# Patient Record
Sex: MALE | Race: WHITE | NOT HISPANIC OR LATINO | Employment: OTHER | URBAN - NONMETROPOLITAN AREA
[De-identification: names, ages, dates, MRNs, and addresses within clinical notes are randomized per-mention and may not be internally consistent; named-entity substitution may affect disease eponyms.]

---

## 2023-02-15 PROBLEM — Z93.3 S/P COLOSTOMY (MULTI): Status: ACTIVE | Noted: 2023-02-15

## 2023-02-15 PROBLEM — I10 HYPERTENSION: Status: ACTIVE | Noted: 2023-02-15

## 2023-02-15 PROBLEM — D64.9 ANEMIA: Status: ACTIVE | Noted: 2023-02-15

## 2023-02-15 PROBLEM — M19.049 ARTHRITIS OF HAND: Status: ACTIVE | Noted: 2023-02-15

## 2023-02-15 PROBLEM — R63.4 WEIGHT LOSS: Status: ACTIVE | Noted: 2023-02-15

## 2023-02-15 PROBLEM — E55.9 VITAMIN D DEFICIENCY: Status: ACTIVE | Noted: 2023-02-15

## 2023-02-15 PROBLEM — E78.5 DYSLIPIDEMIA: Status: ACTIVE | Noted: 2023-02-15

## 2023-02-15 PROBLEM — R00.0 TACHYCARDIA: Status: ACTIVE | Noted: 2023-02-15

## 2023-02-15 PROBLEM — E87.6 HYPOKALEMIA: Status: ACTIVE | Noted: 2023-02-15

## 2023-02-15 PROBLEM — R29.898 FINGER DYSFUNCTION: Status: ACTIVE | Noted: 2023-02-15

## 2023-02-15 RX ORDER — POTASSIUM CHLORIDE 750 MG/1
1 CAPSULE, EXTENDED RELEASE ORAL DAILY
COMMUNITY
Start: 2018-01-17 | End: 2023-05-16

## 2023-02-15 RX ORDER — CARVEDILOL 6.25 MG/1
1 TABLET ORAL
COMMUNITY
Start: 2016-11-09 | End: 2023-03-08 | Stop reason: SDUPTHER

## 2023-02-15 RX ORDER — AMLODIPINE BESYLATE 10 MG/1
1 TABLET ORAL DAILY
COMMUNITY
Start: 2015-11-05 | End: 2023-05-01

## 2023-02-15 RX ORDER — CHOLECALCIFEROL (VITAMIN D3) 125 MCG
1 CAPSULE ORAL EVERY OTHER DAY
COMMUNITY
Start: 2019-02-13 | End: 2023-10-31

## 2023-02-15 RX ORDER — HYDROCHLOROTHIAZIDE 25 MG/1
1 TABLET ORAL DAILY
COMMUNITY
Start: 2015-10-15 | End: 2023-04-24

## 2023-03-08 DIAGNOSIS — I10 PRIMARY HYPERTENSION: Primary | ICD-10-CM

## 2023-03-08 DIAGNOSIS — R00.0 TACHYCARDIA: ICD-10-CM

## 2023-03-08 RX ORDER — CARVEDILOL 6.25 MG/1
6.25 TABLET ORAL
Qty: 180 TABLET | Refills: 3 | Status: SHIPPED | OUTPATIENT
Start: 2023-03-08 | End: 2024-04-08

## 2023-03-29 ENCOUNTER — OFFICE VISIT (OUTPATIENT)
Dept: PRIMARY CARE | Facility: CLINIC | Age: 63
End: 2023-03-29
Payer: MEDICARE

## 2023-03-29 VITALS
BODY MASS INDEX: 27.81 KG/M2 | DIASTOLIC BLOOD PRESSURE: 66 MMHG | WEIGHT: 187.8 LBS | SYSTOLIC BLOOD PRESSURE: 116 MMHG | HEART RATE: 82 BPM | HEIGHT: 69 IN

## 2023-03-29 DIAGNOSIS — Z93.3 S/P COLOSTOMY (MULTI): ICD-10-CM

## 2023-03-29 DIAGNOSIS — Z12.5 PROSTATE CANCER SCREENING: ICD-10-CM

## 2023-03-29 DIAGNOSIS — I10 PRIMARY HYPERTENSION: Primary | ICD-10-CM

## 2023-03-29 DIAGNOSIS — E87.6 HYPOKALEMIA: ICD-10-CM

## 2023-03-29 DIAGNOSIS — E55.9 VITAMIN D DEFICIENCY: ICD-10-CM

## 2023-03-29 DIAGNOSIS — E78.5 DYSLIPIDEMIA: ICD-10-CM

## 2023-03-29 PROCEDURE — 3074F SYST BP LT 130 MM HG: CPT | Performed by: REGISTERED NURSE

## 2023-03-29 PROCEDURE — 99214 OFFICE O/P EST MOD 30 MIN: CPT | Performed by: REGISTERED NURSE

## 2023-03-29 PROCEDURE — 3078F DIAST BP <80 MM HG: CPT | Performed by: REGISTERED NURSE

## 2023-03-29 PROCEDURE — 1159F MED LIST DOCD IN RCRD: CPT | Performed by: REGISTERED NURSE

## 2023-03-29 PROCEDURE — 1160F RVW MEDS BY RX/DR IN RCRD: CPT | Performed by: REGISTERED NURSE

## 2023-03-29 ASSESSMENT — ENCOUNTER SYMPTOMS
DIARRHEA: 0
WHEEZING: 0
DIZZINESS: 0
EYE DISCHARGE: 0
CONFUSION: 0
VOMITING: 0
HEADACHES: 0
EYE REDNESS: 0
DIFFICULTY URINATING: 0
FREQUENCY: 0
CHILLS: 0
WOUND: 0
WEAKNESS: 0
FEVER: 0
NAUSEA: 0
ABDOMINAL PAIN: 0
CONSTIPATION: 0
COUGH: 0
RHINORRHEA: 0
SHORTNESS OF BREATH: 0
NERVOUS/ANXIOUS: 0

## 2023-03-29 NOTE — PROGRESS NOTES
"Subjective   Patient ID: Ramesh Courtney is a 63 y.o. male who presents for Follow-up (No concerns. ).    HPI   Past medical history of colorectal cancer in 2008 s/p colostomy.   Dr. Ramesh Gómez did his surgery   Dr. Mendoza's office before here.     Hypertension- He is taking Amlodipine 10 mg oral tablet and Hydrochlorothiazide 25 mg oral tablet daily. BP today is 116/66     Tachycardia- HR is 82 He is on carvedilol 6.25 mg oral tablet twice a day.     Hypokalemia- K is 3.8. Patient is taking Potassium Chloride capsule every day.     Vitamin D deficiency- Vitamin D level increased, He is on oral vitamin capsule 5000 unit daily.     Dyslipidemia- LDL level improved to 80 from 108 with lifestyle modification.     All other systems reviewed and negative for complaint unless stated above.     Review of Systems   Constitutional:  Negative for chills and fever.   HENT:  Negative for congestion, ear pain and rhinorrhea.    Eyes:  Negative for discharge and redness.   Respiratory:  Negative for cough, shortness of breath and wheezing.    Cardiovascular:  Negative for chest pain and leg swelling.   Gastrointestinal:  Negative for abdominal pain, constipation, diarrhea, nausea and vomiting.   Genitourinary:  Negative for difficulty urinating, frequency and urgency.   Musculoskeletal:  Negative for gait problem.   Skin:  Negative for rash and wound.   Neurological:  Negative for dizziness, weakness and headaches.   Psychiatric/Behavioral:  Negative for confusion. The patient is not nervous/anxious.        Objective   /66 (BP Location: Right arm)   Pulse 82   Ht 1.753 m (5' 9\")   Wt 85.2 kg (187 lb 12.8 oz)   BMI 27.73 kg/m²     Physical Exam  Vitals reviewed.   Constitutional:       Appearance: Normal appearance.   HENT:      Head: Normocephalic.      Right Ear: Tympanic membrane, ear canal and external ear normal.      Left Ear: Tympanic membrane, ear canal and external ear normal.      Nose: No rhinorrhea. "      Mouth/Throat:      Mouth: Mucous membranes are moist.      Pharynx: Oropharynx is clear.   Eyes:      Pupils: Pupils are equal, round, and reactive to light.   Cardiovascular:      Rate and Rhythm: Normal rate and regular rhythm.      Pulses: Normal pulses.   Pulmonary:      Effort: Pulmonary effort is normal.      Breath sounds: Normal breath sounds.   Abdominal:      General: Abdomen is flat. Bowel sounds are normal.      Palpations: Abdomen is soft.   Musculoskeletal:         General: No tenderness. Normal range of motion.      Right lower leg: No edema.      Left lower leg: No edema.   Lymphadenopathy:      Cervical: No cervical adenopathy.   Skin:     General: Skin is warm and dry.      Findings: No rash.   Neurological:      Mental Status: He is alert and oriented to person, place, and time.   Psychiatric:         Mood and Affect: Mood normal.         Behavior: Behavior normal.       Assessment/Plan        HTN  - Continue Amlodipine and Hydrochlorothiazide.  - CBC and BMP is normal     Dyslipidemia  - Continue Lifestyle modification.     Tachycardia  - Continue Carvedilol to 6.25 mg twice a day.      Vitamin D deficiency  - Vitamin D level increased from 22 to 50.  - Continue Vitamin D tablet 5000 Unit every other day now.     Hypokalemia  - Potassium replacement supplement.    S/p colostomy   - doing well      #HCM  Psa ordered   Flu vaccine utd  Colonoscopy 2008 s/p colostomy   Pneumonia vaccine due    Can come in as a walk in for vaccine (electric outage)      Return to clinic in 6 months or earlier as needed.

## 2023-03-31 ENCOUNTER — LAB (OUTPATIENT)
Dept: LAB | Facility: LAB | Age: 63
End: 2023-03-31
Payer: MEDICARE

## 2023-03-31 DIAGNOSIS — E87.6 HYPOKALEMIA: ICD-10-CM

## 2023-03-31 DIAGNOSIS — Z12.5 PROSTATE CANCER SCREENING: ICD-10-CM

## 2023-03-31 DIAGNOSIS — E78.5 DYSLIPIDEMIA: ICD-10-CM

## 2023-03-31 DIAGNOSIS — I10 PRIMARY HYPERTENSION: ICD-10-CM

## 2023-03-31 LAB
ALANINE AMINOTRANSFERASE (SGPT) (U/L) IN SER/PLAS: 20 U/L (ref 10–52)
ALBUMIN (G/DL) IN SER/PLAS: 4.2 G/DL (ref 3.4–5)
ALKALINE PHOSPHATASE (U/L) IN SER/PLAS: 69 U/L (ref 33–136)
ANION GAP IN SER/PLAS: 10 MMOL/L (ref 10–20)
ASPARTATE AMINOTRANSFERASE (SGOT) (U/L) IN SER/PLAS: 16 U/L (ref 9–39)
BASOPHILS (10*3/UL) IN BLOOD BY AUTOMATED COUNT: 0.01 X10E9/L (ref 0–0.1)
BASOPHILS/100 LEUKOCYTES IN BLOOD BY AUTOMATED COUNT: 0.2 % (ref 0–2)
BILIRUBIN TOTAL (MG/DL) IN SER/PLAS: 0.4 MG/DL (ref 0–1.2)
CALCIUM (MG/DL) IN SER/PLAS: 9.2 MG/DL (ref 8.6–10.3)
CARBON DIOXIDE, TOTAL (MMOL/L) IN SER/PLAS: 31 MMOL/L (ref 21–32)
CHLORIDE (MMOL/L) IN SER/PLAS: 102 MMOL/L (ref 98–107)
CHOLESTEROL (MG/DL) IN SER/PLAS: 137 MG/DL (ref 0–199)
CHOLESTEROL IN HDL (MG/DL) IN SER/PLAS: 47.6 MG/DL
CHOLESTEROL/HDL RATIO: 2.9
CREATININE (MG/DL) IN SER/PLAS: 0.8 MG/DL (ref 0.5–1.3)
EOSINOPHILS (10*3/UL) IN BLOOD BY AUTOMATED COUNT: 0.02 X10E9/L (ref 0–0.7)
EOSINOPHILS/100 LEUKOCYTES IN BLOOD BY AUTOMATED COUNT: 0.5 % (ref 0–6)
ERYTHROCYTE DISTRIBUTION WIDTH (RATIO) BY AUTOMATED COUNT: 14 % (ref 11.5–14.5)
ERYTHROCYTE MEAN CORPUSCULAR HEMOGLOBIN CONCENTRATION (G/DL) BY AUTOMATED: 32.6 G/DL (ref 32–36)
ERYTHROCYTE MEAN CORPUSCULAR VOLUME (FL) BY AUTOMATED COUNT: 92 FL (ref 80–100)
ERYTHROCYTES (10*6/UL) IN BLOOD BY AUTOMATED COUNT: 4.49 X10E12/L (ref 4.5–5.9)
GFR MALE: >90 ML/MIN/1.73M2
GLUCOSE (MG/DL) IN SER/PLAS: 97 MG/DL (ref 74–99)
HEMATOCRIT (%) IN BLOOD BY AUTOMATED COUNT: 41.4 % (ref 41–52)
HEMOGLOBIN (G/DL) IN BLOOD: 13.5 G/DL (ref 13.5–17.5)
IMMATURE GRANULOCYTES/100 LEUKOCYTES IN BLOOD BY AUTOMATED COUNT: 0.2 % (ref 0–0.9)
LDL: 74 MG/DL (ref 0–99)
LEUKOCYTES (10*3/UL) IN BLOOD BY AUTOMATED COUNT: 4.3 X10E9/L (ref 4.4–11.3)
LYMPHOCYTES (10*3/UL) IN BLOOD BY AUTOMATED COUNT: 1.23 X10E9/L (ref 1.2–4.8)
LYMPHOCYTES/100 LEUKOCYTES IN BLOOD BY AUTOMATED COUNT: 28.7 % (ref 13–44)
MONOCYTES (10*3/UL) IN BLOOD BY AUTOMATED COUNT: 0.28 X10E9/L (ref 0.1–1)
MONOCYTES/100 LEUKOCYTES IN BLOOD BY AUTOMATED COUNT: 6.5 % (ref 2–10)
NEUTROPHILS (10*3/UL) IN BLOOD BY AUTOMATED COUNT: 2.74 X10E9/L (ref 1.2–7.7)
NEUTROPHILS/100 LEUKOCYTES IN BLOOD BY AUTOMATED COUNT: 63.9 % (ref 40–80)
PLATELETS (10*3/UL) IN BLOOD AUTOMATED COUNT: 267 X10E9/L (ref 150–450)
POTASSIUM (MMOL/L) IN SER/PLAS: 4.1 MMOL/L (ref 3.5–5.3)
PROTEIN TOTAL: 6.7 G/DL (ref 6.4–8.2)
SODIUM (MMOL/L) IN SER/PLAS: 139 MMOL/L (ref 136–145)
TRIGLYCERIDE (MG/DL) IN SER/PLAS: 79 MG/DL (ref 0–149)
UREA NITROGEN (MG/DL) IN SER/PLAS: 19 MG/DL (ref 6–23)
VLDL: 16 MG/DL (ref 0–40)

## 2023-03-31 PROCEDURE — 36415 COLL VENOUS BLD VENIPUNCTURE: CPT

## 2023-03-31 PROCEDURE — 80061 LIPID PANEL: CPT

## 2023-03-31 PROCEDURE — 80053 COMPREHEN METABOLIC PANEL: CPT

## 2023-03-31 PROCEDURE — 84154 ASSAY OF PSA FREE: CPT

## 2023-03-31 PROCEDURE — 85025 COMPLETE CBC W/AUTO DIFF WBC: CPT

## 2023-03-31 PROCEDURE — 84153 ASSAY OF PSA TOTAL: CPT

## 2023-04-03 DIAGNOSIS — R97.20 ELEVATED PSA, LESS THAN 10 NG/ML: Primary | ICD-10-CM

## 2023-04-03 LAB
PROSTATE SPECIFIC AG (NG/ML) IN SER/PLAS: 8.6 NG/ML (ref 0–4)
PROSTATE SPECIFIC AG FREE (NG/ML) IN SER/PLAS: 0.4 NG/ML
PROSTATE SPECIFIC AG FREE/PROSTATE SPECIFIC AG TOTAL IN SER/PLAS: 5 %

## 2023-04-06 LAB
RBC, URINE: 1 /HPF (ref 0–5)
WBC, URINE: 2 /HPF (ref 0–5)

## 2023-04-07 LAB — URINE CULTURE: NO GROWTH

## 2023-04-24 DIAGNOSIS — I10 ESSENTIAL (PRIMARY) HYPERTENSION: ICD-10-CM

## 2023-04-24 RX ORDER — HYDROCHLOROTHIAZIDE 25 MG/1
TABLET ORAL
Qty: 90 TABLET | Refills: 3 | Status: SHIPPED | OUTPATIENT
Start: 2023-04-24 | End: 2024-04-29

## 2023-04-29 DIAGNOSIS — I10 ESSENTIAL (PRIMARY) HYPERTENSION: ICD-10-CM

## 2023-05-01 RX ORDER — AMLODIPINE BESYLATE 10 MG/1
TABLET ORAL
Qty: 90 TABLET | Refills: 1 | Status: SHIPPED | OUTPATIENT
Start: 2023-05-01 | End: 2023-10-27

## 2023-05-01 RX ORDER — TAMSULOSIN HYDROCHLORIDE 0.4 MG/1
1 CAPSULE ORAL NIGHTLY
COMMUNITY
Start: 2023-04-06 | End: 2024-03-27 | Stop reason: WASHOUT

## 2023-05-16 DIAGNOSIS — E87.6 HYPOKALEMIA: ICD-10-CM

## 2023-05-16 RX ORDER — POTASSIUM CHLORIDE 750 MG/1
CAPSULE, EXTENDED RELEASE ORAL
Qty: 30 CAPSULE | Refills: 5 | Status: SHIPPED | OUTPATIENT
Start: 2023-05-16 | End: 2023-11-29

## 2023-07-20 LAB
AMORPHOUS CRYSTALS, URINE: ABNORMAL /HPF
RBC, URINE: ABNORMAL /HPF (ref 0–5)
WBC, URINE: ABNORMAL /HPF (ref 0–5)

## 2023-07-22 LAB — URINE CULTURE: NO GROWTH

## 2023-07-27 ENCOUNTER — HOSPITAL ENCOUNTER (OUTPATIENT)
Dept: DATA CONVERSION | Facility: HOSPITAL | Age: 63
End: 2023-07-27
Attending: STUDENT IN AN ORGANIZED HEALTH CARE EDUCATION/TRAINING PROGRAM
Payer: MEDICARE

## 2023-07-27 DIAGNOSIS — C61 MALIGNANT NEOPLASM OF PROSTATE (MULTI): ICD-10-CM

## 2023-07-27 DIAGNOSIS — R97.20 ELEVATED PROSTATE SPECIFIC ANTIGEN (PSA): ICD-10-CM

## 2023-08-08 LAB
COMPLETE PATHOLOGY REPORT: NORMAL
CONVERTED CLINICAL DIAGNOSIS-HISTORY: NORMAL
CONVERTED FINAL DIAGNOSIS: NORMAL
CONVERTED FINAL REPORT PDF LINK TO COPY AND PASTE: NORMAL
CONVERTED GROSS DESCRIPTION: NORMAL

## 2023-09-22 PROBLEM — M19.049 ARTHRITIS OF HAND: Status: RESOLVED | Noted: 2023-02-15 | Resolved: 2023-09-22

## 2023-09-22 PROBLEM — R97.20 ELEVATED PSA: Status: ACTIVE | Noted: 2023-09-22

## 2023-09-22 PROBLEM — C61 PROSTATE CANCER (MULTI): Status: ACTIVE | Noted: 2023-09-22

## 2023-09-22 PROBLEM — R63.4 WEIGHT LOSS: Status: RESOLVED | Noted: 2023-02-15 | Resolved: 2023-09-22

## 2023-09-22 PROBLEM — R35.0 URINARY FREQUENCY: Status: ACTIVE | Noted: 2023-09-22

## 2023-09-22 PROBLEM — R29.898 FINGER DYSFUNCTION: Status: RESOLVED | Noted: 2023-02-15 | Resolved: 2023-09-22

## 2023-09-22 PROBLEM — N40.0 BPH (BENIGN PROSTATIC HYPERPLASIA): Status: ACTIVE | Noted: 2023-09-22

## 2023-09-22 NOTE — PROGRESS NOTES
Subjective   Reason for Visit: Ramesh Courtney is an 63 y.o. male here for a Medicare Wellness visit.          Reviewed all medications by prescribing practitioner or clinical pharmacist (such as prescriptions, OTCs, herbal therapies and supplements) and documented in the medical record.    HPI  Past medical history of colorectal cancer in 2008 s/p colostomy.   Dr. Ramesh Gómez did his surgery   Dr. Mendoza's office before here.     Prostate cancer: Getting repeat PSA in October. Just monitoring this for now. Following with Dr. Crocker. Had biopsy done in July 2023.     Hypertension- He is taking Amlodipine 10 mg oral tablet and Hydrochlorothiazide 25 mg oral tablet daily. Well controlled      Tachycardia- He is on carvedilol 6.25 mg oral tablet twice a day.     Hypokalemia-  Patient is taking Potassium Chloride capsule every day.     Vitamin D deficiency- Vitamin D level increased, He is on oral vitamin capsule 5000 unit daily.     Dyslipidemia- LDL level improved to 80 from 108 with lifestyle modification.     All other systems reviewed and negative for complaint unless stated above.     Patient Care Team:  ALFONSO Del Rio-CNP as PCP - General  Sylvia Jiang DO as PCP - [facility] PCP  RADHA Del Rio as PCP - [facility] [employer] PCP     Review of Systems   Constitutional:  Negative for chills and fever.   HENT:  Negative for congestion, ear pain and rhinorrhea.    Eyes:  Negative for discharge and redness.   Respiratory:  Negative for cough, shortness of breath and wheezing.    Cardiovascular:  Negative for chest pain and leg swelling.   Gastrointestinal:  Negative for abdominal pain, constipation, diarrhea, nausea and vomiting.   Genitourinary:  Negative for difficulty urinating, frequency and urgency.   Musculoskeletal:  Negative for gait problem.   Skin:  Negative for rash and wound.   Neurological:  Negative for dizziness, weakness and headaches.  "  Psychiatric/Behavioral:  Negative for confusion. The patient is not nervous/anxious.        Objective   Vitals:  /71 (BP Location: Right arm, Patient Position: Sitting, BP Cuff Size: Adult)   Pulse 69   Ht 1.753 m (5' 9\")   Wt 84.4 kg (186 lb)   SpO2 98%   BMI 27.47 kg/m²       Physical Exam  Vitals reviewed.   Constitutional:       Appearance: Normal appearance.   HENT:      Head: Normocephalic.      Right Ear: Tympanic membrane, ear canal and external ear normal.      Left Ear: Tympanic membrane, ear canal and external ear normal.      Nose: No rhinorrhea.      Mouth/Throat:      Mouth: Mucous membranes are moist.      Pharynx: Oropharynx is clear.   Eyes:      Pupils: Pupils are equal, round, and reactive to light.   Cardiovascular:      Rate and Rhythm: Normal rate and regular rhythm.      Pulses: Normal pulses.   Pulmonary:      Effort: Pulmonary effort is normal.      Breath sounds: Normal breath sounds.   Abdominal:      General: Abdomen is flat. Bowel sounds are normal.      Palpations: Abdomen is soft.   Musculoskeletal:         General: No tenderness. Normal range of motion.      Right lower leg: No edema.      Left lower leg: No edema.   Lymphadenopathy:      Cervical: No cervical adenopathy.   Skin:     General: Skin is warm and dry.      Findings: No rash.   Neurological:      Mental Status: He is alert and oriented to person, place, and time.   Psychiatric:         Mood and Affect: Mood normal.         Behavior: Behavior normal.         Assessment/Plan   Problem List Items Addressed This Visit    None  Visit Diagnoses       Encounter for immunization        Relevant Orders    Flu vaccine, quadrivalent, high-dose, preservative free, age 65y+ (FLUZONE) (Completed)        #Prostate cancer  Following with Dr. Crocker  Monitoring PSA      HTN  - Continue Amlodipine and Hydrochlorothiazide.  - CBC and BMP is normal     Dyslipidemia  - Continue Lifestyle modification.     Tachycardia  - Continue " Carvedilol to 6.25 mg twice a day.      Vitamin D deficiency  - Vitamin D level increased from 22 to 50.  - Continue Vitamin D tablet 5000 Unit every other day now.     Hypokalemia  - Potassium replacement supplement.     S/p colostomy   - doing well      #HCM  Flu vaccine done today   Colonoscopy 2008 s/p colostomy   Pneumonia vaccine due, administered today 9/27/23       Return to clinic in 6 months or earlier as needed.

## 2023-09-27 ENCOUNTER — OFFICE VISIT (OUTPATIENT)
Dept: PRIMARY CARE | Facility: CLINIC | Age: 63
End: 2023-09-27
Payer: MEDICARE

## 2023-09-27 VITALS
BODY MASS INDEX: 27.55 KG/M2 | HEART RATE: 69 BPM | SYSTOLIC BLOOD PRESSURE: 112 MMHG | HEIGHT: 69 IN | DIASTOLIC BLOOD PRESSURE: 71 MMHG | WEIGHT: 186 LBS | OXYGEN SATURATION: 98 %

## 2023-09-27 DIAGNOSIS — Z00.00 ROUTINE GENERAL MEDICAL EXAMINATION AT HEALTH CARE FACILITY: Primary | ICD-10-CM

## 2023-09-27 DIAGNOSIS — E55.9 VITAMIN D DEFICIENCY: ICD-10-CM

## 2023-09-27 DIAGNOSIS — E87.6 HYPOKALEMIA: ICD-10-CM

## 2023-09-27 DIAGNOSIS — Z23 ENCOUNTER FOR IMMUNIZATION: ICD-10-CM

## 2023-09-27 PROCEDURE — G0009 ADMIN PNEUMOCOCCAL VACCINE: HCPCS | Performed by: REGISTERED NURSE

## 2023-09-27 PROCEDURE — 3078F DIAST BP <80 MM HG: CPT | Performed by: REGISTERED NURSE

## 2023-09-27 PROCEDURE — 1170F FXNL STATUS ASSESSED: CPT | Performed by: REGISTERED NURSE

## 2023-09-27 PROCEDURE — 1160F RVW MEDS BY RX/DR IN RCRD: CPT | Performed by: REGISTERED NURSE

## 2023-09-27 PROCEDURE — 1159F MED LIST DOCD IN RCRD: CPT | Performed by: REGISTERED NURSE

## 2023-09-27 PROCEDURE — 90662 IIV NO PRSV INCREASED AG IM: CPT | Performed by: REGISTERED NURSE

## 2023-09-27 PROCEDURE — G0402 INITIAL PREVENTIVE EXAM: HCPCS | Performed by: REGISTERED NURSE

## 2023-09-27 PROCEDURE — G0008 ADMIN INFLUENZA VIRUS VAC: HCPCS | Performed by: REGISTERED NURSE

## 2023-09-27 PROCEDURE — 3074F SYST BP LT 130 MM HG: CPT | Performed by: REGISTERED NURSE

## 2023-09-27 PROCEDURE — 90677 PCV20 VACCINE IM: CPT | Performed by: REGISTERED NURSE

## 2023-09-27 ASSESSMENT — ACTIVITIES OF DAILY LIVING (ADL)
BATHING: INDEPENDENT
GROCERY_SHOPPING: INDEPENDENT
MANAGING_FINANCES: INDEPENDENT
DOING_HOUSEWORK: INDEPENDENT
TAKING_MEDICATION: INDEPENDENT
DRESSING: INDEPENDENT

## 2023-09-27 ASSESSMENT — ENCOUNTER SYMPTOMS
NAUSEA: 0
CONSTIPATION: 0
ABDOMINAL PAIN: 0
SHORTNESS OF BREATH: 0
EYE DISCHARGE: 0
NERVOUS/ANXIOUS: 0
DEPRESSION: 1
CHILLS: 0
HEADACHES: 0
RHINORRHEA: 0
DIFFICULTY URINATING: 0
CONFUSION: 0
WHEEZING: 0
WOUND: 0
EYE REDNESS: 0
LOSS OF SENSATION IN FEET: 0
OCCASIONAL FEELINGS OF UNSTEADINESS: 0
WEAKNESS: 0
DIARRHEA: 0
VOMITING: 0
COUGH: 0
FEVER: 0
DIZZINESS: 0
FREQUENCY: 0

## 2023-09-27 ASSESSMENT — PATIENT HEALTH QUESTIONNAIRE - PHQ9
SUM OF ALL RESPONSES TO PHQ9 QUESTIONS 1 AND 2: 1
2. FEELING DOWN, DEPRESSED OR HOPELESS: SEVERAL DAYS
10. IF YOU CHECKED OFF ANY PROBLEMS, HOW DIFFICULT HAVE THESE PROBLEMS MADE IT FOR YOU TO DO YOUR WORK, TAKE CARE OF THINGS AT HOME, OR GET ALONG WITH OTHER PEOPLE: NOT DIFFICULT AT ALL
1. LITTLE INTEREST OR PLEASURE IN DOING THINGS: NOT AT ALL

## 2023-10-04 ENCOUNTER — LAB (OUTPATIENT)
Dept: LAB | Facility: LAB | Age: 63
End: 2023-10-04
Payer: MEDICARE

## 2023-10-04 DIAGNOSIS — E87.6 HYPOKALEMIA: ICD-10-CM

## 2023-10-04 DIAGNOSIS — E55.9 VITAMIN D DEFICIENCY: ICD-10-CM

## 2023-10-04 LAB
ANION GAP SERPL CALC-SCNC: 12 MMOL/L (ref 10–20)
BUN SERPL-MCNC: 20 MG/DL (ref 6–23)
CALCIUM SERPL-MCNC: 9.5 MG/DL (ref 8.6–10.3)
CHLORIDE SERPL-SCNC: 102 MMOL/L (ref 98–107)
CO2 SERPL-SCNC: 30 MMOL/L (ref 21–32)
CREAT SERPL-MCNC: 0.8 MG/DL (ref 0.5–1.3)
GFR SERPL CREATININE-BSD FRML MDRD: >90 ML/MIN/1.73M*2
GLUCOSE SERPL-MCNC: 89 MG/DL (ref 74–99)
POTASSIUM SERPL-SCNC: 4.2 MMOL/L (ref 3.5–5.3)
SODIUM SERPL-SCNC: 140 MMOL/L (ref 136–145)

## 2023-10-04 PROCEDURE — 82306 VITAMIN D 25 HYDROXY: CPT

## 2023-10-04 PROCEDURE — 36415 COLL VENOUS BLD VENIPUNCTURE: CPT

## 2023-10-05 LAB — 25(OH)D3 SERPL-MCNC: 49 NG/ML (ref 30–100)

## 2023-10-27 DIAGNOSIS — I10 ESSENTIAL (PRIMARY) HYPERTENSION: ICD-10-CM

## 2023-10-27 RX ORDER — AMLODIPINE BESYLATE 10 MG/1
TABLET ORAL
Qty: 90 TABLET | Refills: 1 | Status: SHIPPED | OUTPATIENT
Start: 2023-10-27 | End: 2024-04-29

## 2023-10-31 DIAGNOSIS — E55.9 VITAMIN D DEFICIENCY, UNSPECIFIED: ICD-10-CM

## 2023-10-31 RX ORDER — CHOLECALCIFEROL (VITAMIN D3) 125 MCG
5000 CAPSULE ORAL EVERY OTHER DAY
Qty: 15 CAPSULE | Refills: 11 | Status: SHIPPED | OUTPATIENT
Start: 2023-10-31

## 2023-11-02 ENCOUNTER — LAB (OUTPATIENT)
Dept: LAB | Facility: LAB | Age: 63
End: 2023-11-02
Payer: MEDICARE

## 2023-11-02 DIAGNOSIS — C61 MALIGNANT NEOPLASM OF PROSTATE (MULTI): Primary | ICD-10-CM

## 2023-11-02 PROCEDURE — 84153 ASSAY OF PSA TOTAL: CPT

## 2023-11-02 PROCEDURE — 36415 COLL VENOUS BLD VENIPUNCTURE: CPT

## 2023-11-03 LAB — PSA SERPL-MCNC: 6.7 NG/ML

## 2023-11-13 ENCOUNTER — OFFICE VISIT (OUTPATIENT)
Dept: UROLOGY | Facility: CLINIC | Age: 63
End: 2023-11-13
Payer: MEDICARE

## 2023-11-13 DIAGNOSIS — C61 PROSTATE CANCER (MULTI): ICD-10-CM

## 2023-11-13 DIAGNOSIS — N40.1 BENIGN PROSTATIC HYPERPLASIA WITH LOWER URINARY TRACT SYMPTOMS, SYMPTOM DETAILS UNSPECIFIED: Primary | ICD-10-CM

## 2023-11-13 PROCEDURE — 1159F MED LIST DOCD IN RCRD: CPT | Performed by: STUDENT IN AN ORGANIZED HEALTH CARE EDUCATION/TRAINING PROGRAM

## 2023-11-13 PROCEDURE — 3074F SYST BP LT 130 MM HG: CPT | Performed by: STUDENT IN AN ORGANIZED HEALTH CARE EDUCATION/TRAINING PROGRAM

## 2023-11-13 PROCEDURE — 99213 OFFICE O/P EST LOW 20 MIN: CPT | Performed by: STUDENT IN AN ORGANIZED HEALTH CARE EDUCATION/TRAINING PROGRAM

## 2023-11-13 PROCEDURE — 1160F RVW MEDS BY RX/DR IN RCRD: CPT | Performed by: STUDENT IN AN ORGANIZED HEALTH CARE EDUCATION/TRAINING PROGRAM

## 2023-11-13 PROCEDURE — 3078F DIAST BP <80 MM HG: CPT | Performed by: STUDENT IN AN ORGANIZED HEALTH CARE EDUCATION/TRAINING PROGRAM

## 2023-11-13 NOTE — PROGRESS NOTES
Subjective   Patient ID: Ramesh Courtney is a 63 y.o. male.    HPI  64 yo male hx of LUTS. On tamsulosin, and elevated PSA. Negative UA micro, and urine culture. He underwent transgluteal MRI in-gantry prostate biopsy 7/27/23. Surgical pathology showed prostate cancer.      History of proctectomy following radiation to rectum.     He is currently off tamsulosin, has been of for 3 weeks. He has some effects on his vision. He is doing well off the medication.      We reviewed pathology, positive for prostate cancer.  He met with Dr. Dias in radiation oncology, he thinks he would like to pursue active surveillance.       Prostate biopsy 7/27/23.   FINAL DIAGNOSIS  A. PROSTATE, LEFT MID PZ, BIOPSY:  - PROSTATIC ACINAR ADENOCARCINOMA, MEJIA SCORE 7 (3+4), GRADE GROUP 2,  INVOLVING   2 OF 2 CORES AND APPROXIMATELY 30% OF SUBMITTED TISSUE   - PERCENTAGE OF MEJIA PATTERN 4: 10%    Review of Systems   All other systems reviewed and are negative.      Objective   Physical Exam  Vitals reviewed.         Assessment/Plan   64 yo male with history of colorectal cancer in 2008, s/p colostomy, XRT and irritative voiding symptoms, on tamsulosin, and elevated PSA of 8.6 in March 2023 and f/t=0.4 and 5%. He underwent transperineal MRI in-gantry prostate biopsy 7/27/23.     He is currently off tamsulosin. He has some effects on his vision. He is doing well off the medication.      We reviewed surgical pathology from 7/27/23 which showed prostate cancer.     He met with Dr. Dias in radiation oncology, he thinks he would like to pursue active surveillance. We will order PSA in May 2024, if abrupt increase is seen we will order MRI.     Plan:   Continue with active surveillance  PSA May 2024.   FUV May 2024.     Diagnoses and all orders for this visit:  Benign prostatic hyperplasia with lower urinary tract symptoms, symptom details unspecified  Prostate cancer (CMS/HCC)  -     PSA; Future

## 2023-11-29 DIAGNOSIS — E87.6 HYPOKALEMIA: ICD-10-CM

## 2023-11-29 RX ORDER — POTASSIUM CHLORIDE 750 MG/1
CAPSULE, EXTENDED RELEASE ORAL
Qty: 30 CAPSULE | Refills: 5 | Status: SHIPPED | OUTPATIENT
Start: 2023-11-29 | End: 2024-06-10

## 2024-03-20 NOTE — PROGRESS NOTES
Subjective   Reason for Visit: Ramesh Courtney is an 64 y.o. male here for a Medicare Wellness visit.          Reviewed all medications by prescribing practitioner or clinical pharmacist (such as prescriptions, OTCs, herbal therapies and supplements) and documented in the medical record.    HPI  Past medical history of colorectal cancer in 2008 s/p colostomy.   Dr. Ramesh Gómez did his surgery   Dr. Mendoza's office before here.     Prostate cancer: Getting repeat PSA in October. Just monitoring this for now. Following with Dr. Crocker. Had biopsy done in July 2023.      Hypertension- He is taking Amlodipine 10 mg oral tablet and Hydrochlorothiazide 25 mg oral tablet daily. Well controlled      Tachycardia- He is on carvedilol 6.25 mg oral tablet twice a day.     Hypokalemia-  Patient is taking Potassium Chloride capsule every day.     Vitamin D deficiency- Vitamin D level increased, He is on oral vitamin capsule 5000 unit daily.     Dyslipidemia- LDL level improved to 80 from 108 with lifestyle modification.    Nicotine dependence: Smokes less than 20 cigs per day, Started smoking when he was 15 years old, Quit for 4 years.      All other systems reviewed and negative for complaint unless stated above.     Patient Care Team:  ALFONSO Del Rio-CNP as PCP - General     Review of Systems   Constitutional:  Negative for chills and fever.   HENT:  Negative for congestion, ear pain and rhinorrhea.    Eyes:  Negative for discharge and redness.   Respiratory:  Negative for cough, shortness of breath and wheezing.    Cardiovascular:  Negative for chest pain and leg swelling.   Gastrointestinal:  Negative for abdominal pain, constipation, diarrhea, nausea and vomiting.   Genitourinary:  Negative for difficulty urinating, frequency and urgency.   Musculoskeletal:  Negative for gait problem.   Skin:  Negative for rash and wound.   Neurological:  Negative for dizziness, weakness and headaches.  "  Psychiatric/Behavioral:  Negative for confusion. The patient is not nervous/anxious.        Objective   Vitals:  /75 (BP Location: Right arm, Patient Position: Sitting, BP Cuff Size: Adult)   Pulse 80   Ht 1.676 m (5' 6\")   Wt 82.8 kg (182 lb 9.6 oz)   BMI 29.47 kg/m²       Physical Exam  Vitals reviewed.   Constitutional:       Appearance: Normal appearance.   HENT:      Head: Normocephalic.      Right Ear: Tympanic membrane, ear canal and external ear normal.      Left Ear: Tympanic membrane, ear canal and external ear normal.      Nose: No rhinorrhea.      Mouth/Throat:      Mouth: Mucous membranes are moist.      Pharynx: Oropharynx is clear.   Eyes:      Pupils: Pupils are equal, round, and reactive to light.   Cardiovascular:      Rate and Rhythm: Normal rate and regular rhythm.      Pulses: Normal pulses.   Pulmonary:      Effort: Pulmonary effort is normal.      Breath sounds: Normal breath sounds.   Abdominal:      General: Abdomen is flat. Bowel sounds are normal.      Palpations: Abdomen is soft.   Musculoskeletal:         General: No tenderness. Normal range of motion.      Right lower leg: No edema.      Left lower leg: No edema.   Lymphadenopathy:      Cervical: No cervical adenopathy.   Skin:     General: Skin is warm and dry.      Findings: No rash.   Neurological:      Mental Status: He is alert and oriented to person, place, and time. Mental status is at baseline.   Psychiatric:         Mood and Affect: Mood normal.         Behavior: Behavior normal.       Assessment/Plan   Problem List Items Addressed This Visit    None       #Prostate cancer  Following with Dr. Crocker  Monitoring PSA       HTN  - Continue Amlodipine and Hydrochlorothiazide.  - CBC and BMP is normal     Dyslipidemia  - Continue Lifestyle modification.     Tachycardia  - Continue Carvedilol to 6.25 mg twice a day.      Vitamin D deficiency  - Vitamin D level 49  - Continue Vitamin D tablet 5000 Unit every other day " now.     Hypokalemia  - Potassium replacement supplement.     S/p colostomy   - doing well     #Nicotine dependence   ~ ppd smoker 45 years   Declines low dose lung cancer screening at this time      #HCM  Flu vaccine due in fall   Colonoscopy 2008 s/p colostomy   Pneumonia administered 9/27/23  Will ask pharmacy about covid booster         Return to clinic in 6 months or earlier as needed.

## 2024-03-27 ENCOUNTER — OFFICE VISIT (OUTPATIENT)
Dept: PRIMARY CARE | Facility: CLINIC | Age: 64
End: 2024-03-27
Payer: MEDICARE

## 2024-03-27 VITALS
HEART RATE: 80 BPM | WEIGHT: 182.6 LBS | HEIGHT: 66 IN | BODY MASS INDEX: 29.35 KG/M2 | SYSTOLIC BLOOD PRESSURE: 111 MMHG | DIASTOLIC BLOOD PRESSURE: 75 MMHG

## 2024-03-27 DIAGNOSIS — Z00.00 ROUTINE GENERAL MEDICAL EXAMINATION AT HEALTH CARE FACILITY: Primary | ICD-10-CM

## 2024-03-27 DIAGNOSIS — Z13.1 DIABETES MELLITUS SCREENING: ICD-10-CM

## 2024-03-27 DIAGNOSIS — R97.20 ELEVATED PSA: ICD-10-CM

## 2024-03-27 DIAGNOSIS — E55.9 VITAMIN D DEFICIENCY: ICD-10-CM

## 2024-03-27 DIAGNOSIS — I10 PRIMARY HYPERTENSION: ICD-10-CM

## 2024-03-27 DIAGNOSIS — C61 PROSTATE CANCER (MULTI): ICD-10-CM

## 2024-03-27 DIAGNOSIS — E78.5 DYSLIPIDEMIA: ICD-10-CM

## 2024-03-27 PROCEDURE — 1159F MED LIST DOCD IN RCRD: CPT | Performed by: REGISTERED NURSE

## 2024-03-27 PROCEDURE — 3078F DIAST BP <80 MM HG: CPT | Performed by: REGISTERED NURSE

## 2024-03-27 PROCEDURE — 1160F RVW MEDS BY RX/DR IN RCRD: CPT | Performed by: REGISTERED NURSE

## 2024-03-27 PROCEDURE — 1170F FXNL STATUS ASSESSED: CPT | Performed by: REGISTERED NURSE

## 2024-03-27 PROCEDURE — 3074F SYST BP LT 130 MM HG: CPT | Performed by: REGISTERED NURSE

## 2024-03-27 PROCEDURE — G0439 PPPS, SUBSEQ VISIT: HCPCS | Performed by: REGISTERED NURSE

## 2024-03-27 ASSESSMENT — PATIENT HEALTH QUESTIONNAIRE - PHQ9
1. LITTLE INTEREST OR PLEASURE IN DOING THINGS: NOT AT ALL
SUM OF ALL RESPONSES TO PHQ9 QUESTIONS 1 AND 2: 0
2. FEELING DOWN, DEPRESSED OR HOPELESS: NOT AT ALL

## 2024-03-27 ASSESSMENT — ACTIVITIES OF DAILY LIVING (ADL)
DOING_HOUSEWORK: INDEPENDENT
TAKING_MEDICATION: INDEPENDENT
GROCERY_SHOPPING: INDEPENDENT
DRESSING: INDEPENDENT
MANAGING_FINANCES: INDEPENDENT
BATHING: INDEPENDENT

## 2024-03-27 ASSESSMENT — ENCOUNTER SYMPTOMS
COUGH: 0
WOUND: 0
WHEEZING: 0
SHORTNESS OF BREATH: 0
OCCASIONAL FEELINGS OF UNSTEADINESS: 0
EYE DISCHARGE: 0
CONSTIPATION: 0
DIFFICULTY URINATING: 0
HEADACHES: 0
DIARRHEA: 0
LOSS OF SENSATION IN FEET: 0
DEPRESSION: 0
WEAKNESS: 0
NERVOUS/ANXIOUS: 0
DIZZINESS: 0
ABDOMINAL PAIN: 0
NAUSEA: 0
CHILLS: 0
RHINORRHEA: 0
VOMITING: 0
FREQUENCY: 0
FEVER: 0
EYE REDNESS: 0
CONFUSION: 0

## 2024-04-07 DIAGNOSIS — R00.0 TACHYCARDIA: ICD-10-CM

## 2024-04-07 DIAGNOSIS — I10 PRIMARY HYPERTENSION: ICD-10-CM

## 2024-04-08 RX ORDER — CARVEDILOL 6.25 MG/1
TABLET ORAL
Qty: 180 TABLET | Refills: 3 | Status: SHIPPED | OUTPATIENT
Start: 2024-04-08

## 2024-04-27 DIAGNOSIS — I10 ESSENTIAL (PRIMARY) HYPERTENSION: ICD-10-CM

## 2024-04-29 DIAGNOSIS — I10 ESSENTIAL (PRIMARY) HYPERTENSION: ICD-10-CM

## 2024-04-29 RX ORDER — AMLODIPINE BESYLATE 10 MG/1
TABLET ORAL
Qty: 90 TABLET | Refills: 1 | Status: SHIPPED | OUTPATIENT
Start: 2024-04-29

## 2024-04-29 RX ORDER — HYDROCHLOROTHIAZIDE 25 MG/1
TABLET ORAL
Qty: 90 TABLET | Refills: 3 | Status: SHIPPED | OUTPATIENT
Start: 2024-04-29

## 2024-05-13 ENCOUNTER — APPOINTMENT (OUTPATIENT)
Dept: UROLOGY | Facility: CLINIC | Age: 64
End: 2024-05-13
Payer: MEDICARE

## 2024-05-22 ENCOUNTER — LAB (OUTPATIENT)
Dept: LAB | Facility: LAB | Age: 64
End: 2024-05-22
Payer: MEDICARE

## 2024-05-22 DIAGNOSIS — C61 PROSTATE CANCER (MULTI): ICD-10-CM

## 2024-05-22 LAB — PSA SERPL-MCNC: 8.38 NG/ML

## 2024-05-22 PROCEDURE — 84153 ASSAY OF PSA TOTAL: CPT

## 2024-05-22 PROCEDURE — 36415 COLL VENOUS BLD VENIPUNCTURE: CPT

## 2024-05-30 ENCOUNTER — OFFICE VISIT (OUTPATIENT)
Dept: UROLOGY | Facility: CLINIC | Age: 64
End: 2024-05-30
Payer: MEDICARE

## 2024-05-30 DIAGNOSIS — C61 PROSTATE CANCER (MULTI): ICD-10-CM

## 2024-05-30 PROCEDURE — G2211 COMPLEX E/M VISIT ADD ON: HCPCS | Performed by: STUDENT IN AN ORGANIZED HEALTH CARE EDUCATION/TRAINING PROGRAM

## 2024-05-30 PROCEDURE — 1159F MED LIST DOCD IN RCRD: CPT | Performed by: STUDENT IN AN ORGANIZED HEALTH CARE EDUCATION/TRAINING PROGRAM

## 2024-05-30 PROCEDURE — 1160F RVW MEDS BY RX/DR IN RCRD: CPT | Performed by: STUDENT IN AN ORGANIZED HEALTH CARE EDUCATION/TRAINING PROGRAM

## 2024-05-30 PROCEDURE — 99213 OFFICE O/P EST LOW 20 MIN: CPT | Performed by: STUDENT IN AN ORGANIZED HEALTH CARE EDUCATION/TRAINING PROGRAM

## 2024-05-30 NOTE — PROGRESS NOTES
Subjective   Patient ID: Ramesh Courtney is a 64 y.o. male for a follow up.    HPI  64 y.o. male for a follow up, AS prostate cancer.  History of LUTS. On tamsulosin, and elevated PSA. Negative UA micro, and urine culture. He underwent transgluteal MRI in-gantry prostate biopsy 7/27/23. Surgical pathology showed prostate cancer GG2.  History of proctectomy following radiation to rectum.  He is currently off tamsulosin. He has some effects on his vision. He is doing well off the medication.   He met with Dr. Dias in radiation oncology, he thinks he would like to pursue active surveillance.      Review of Systems  All the systems are reviewed and are negative.    Labs:          Component  Ref Range & Units 8 d ago 7 mo ago 1 yr ago   Prostate Specific AG  <=4.00 ng/mL 8.38 High  6.70 High  8.6 High  R, CM          Objective   Physical Exam    General: Well developed, well nourished, alert and cooperative, appears in no acute distress       Assessment/Plan   Diagnoses and all orders for this visit:  Prostate cancer (Multi)  -     MR prostate with julio boundaries; Future  -     Prostate Specific Antigen; Future    Slight rise in PSA    Plan:  - MRI prostate in November  - PSA in November  - Follow up in 6 months.    Scribe Attestation  By signing my name below, IArabella Scribe   attest that this documentation has been prepared under the direction and in the presence of Joyce Crocker MD.

## 2024-06-10 DIAGNOSIS — E87.6 HYPOKALEMIA: ICD-10-CM

## 2024-06-10 RX ORDER — POTASSIUM CHLORIDE 750 MG/1
CAPSULE, EXTENDED RELEASE ORAL
Qty: 30 CAPSULE | Refills: 5 | Status: SHIPPED | OUTPATIENT
Start: 2024-06-10

## 2024-09-26 NOTE — PROGRESS NOTES
Subjective   Patient ID: Ramesh Courtney is a 64 y.o. male who presents for Follow-up (No concerns at this time;).    HPI     Past medical history of colorectal cancer in 2008 s/p colostomy.   Dr. Ramesh Gómez did his surgery   Dr. Mendoza's office before here.     Prostate cancer: Getting repeat PSA in October. Just monitoring this for now. Following with Dr. Crocker. Had biopsy done in July 2023.      Hypertension- He is taking Amlodipine 10 mg oral tablet and Hydrochlorothiazide 25 mg oral tablet daily. Well controlled      Tachycardia- He is on carvedilol 6.25 mg oral tablet twice a day.     Hypokalemia-  Patient is taking Potassium Chloride capsule every day.     Vitamin D deficiency- Vitamin D level increased, He is on oral vitamin capsule 5000 unit daily.     Dyslipidemia- LDL level improved to 80 from 108 with lifestyle modification.     Nicotine dependence: Smokes less than 20 cigs per day, Started smoking when he was 15 years old, Quit for 4 years.      All other systems reviewed and negative for complaint unless stated above.     Review of Systems    Objective   /74 (BP Location: Right arm, Patient Position: Sitting, BP Cuff Size: Large adult)   Pulse 84   Wt 82.8 kg (182 lb 9.6 oz)   BMI 29.47 kg/m²     Physical Exam  Constitutional:       Appearance: Normal appearance.   Cardiovascular:      Rate and Rhythm: Normal rate and regular rhythm.   Pulmonary:      Effort: Pulmonary effort is normal.      Breath sounds: Normal breath sounds.   Skin:     General: Skin is warm and dry.   Neurological:      Mental Status: He is alert and oriented to person, place, and time. Mental status is at baseline.   Psychiatric:         Mood and Affect: Mood normal.         Behavior: Behavior normal.         Assessment/Plan       #Prostate cancer  Following with Dr. Crocker  Monitoring PSA       HTN  - Continue Amlodipine and Hydrochlorothiazide.  - CBC and BMP is normal     Dyslipidemia  - Continue  Lifestyle modification.     Tachycardia  - Continue Carvedilol to 6.25 mg twice a day.      Vitamin D deficiency  - Vitamin D level 49  - Continue Vitamin D tablet 5000 Unit every other day now.     Hypokalemia  - Potassium replacement supplement.     S/p colostomy   - doing well      #Nicotine dependence   ~ ppd smoker 45 years   Declines low dose lung cancer screening at this time   Discusses benefits and risks, patient declines at this time  States he would not like treatment even if he did have lung cancer      #HCM  Flu vaccine done today in office   Colonoscopy 2008 s/p colostomy   Pneumonia administered 9/27/23  Will ask pharmacy about covid booster   MCA done 3/2024      Return to clinic in 6 months or earlier as needed

## 2024-09-30 ENCOUNTER — APPOINTMENT (OUTPATIENT)
Dept: PRIMARY CARE | Facility: CLINIC | Age: 64
End: 2024-09-30
Payer: MEDICARE

## 2024-09-30 VITALS
HEART RATE: 84 BPM | DIASTOLIC BLOOD PRESSURE: 74 MMHG | WEIGHT: 182.6 LBS | SYSTOLIC BLOOD PRESSURE: 118 MMHG | BODY MASS INDEX: 29.47 KG/M2

## 2024-09-30 DIAGNOSIS — E55.9 VITAMIN D DEFICIENCY: ICD-10-CM

## 2024-09-30 DIAGNOSIS — N40.0 BENIGN PROSTATIC HYPERPLASIA WITHOUT LOWER URINARY TRACT SYMPTOMS: ICD-10-CM

## 2024-09-30 DIAGNOSIS — R97.20 ELEVATED PSA: ICD-10-CM

## 2024-09-30 DIAGNOSIS — E78.5 DYSLIPIDEMIA: ICD-10-CM

## 2024-09-30 DIAGNOSIS — Z23 ENCOUNTER FOR IMMUNIZATION: ICD-10-CM

## 2024-09-30 DIAGNOSIS — Z93.3 S/P COLOSTOMY (MULTI): ICD-10-CM

## 2024-09-30 DIAGNOSIS — I10 PRIMARY HYPERTENSION: Primary | ICD-10-CM

## 2024-09-30 PROCEDURE — 1124F ACP DISCUSS-NO DSCNMKR DOCD: CPT | Performed by: REGISTERED NURSE

## 2024-09-30 PROCEDURE — 90662 IIV NO PRSV INCREASED AG IM: CPT | Performed by: REGISTERED NURSE

## 2024-09-30 PROCEDURE — 99214 OFFICE O/P EST MOD 30 MIN: CPT | Performed by: REGISTERED NURSE

## 2024-09-30 PROCEDURE — 1159F MED LIST DOCD IN RCRD: CPT | Performed by: REGISTERED NURSE

## 2024-09-30 PROCEDURE — 3074F SYST BP LT 130 MM HG: CPT | Performed by: REGISTERED NURSE

## 2024-09-30 PROCEDURE — 3078F DIAST BP <80 MM HG: CPT | Performed by: REGISTERED NURSE

## 2024-09-30 PROCEDURE — G0008 ADMIN INFLUENZA VIRUS VAC: HCPCS | Performed by: REGISTERED NURSE

## 2024-10-01 ENCOUNTER — LAB (OUTPATIENT)
Dept: LAB | Facility: LAB | Age: 64
End: 2024-10-01
Payer: MEDICARE

## 2024-10-01 DIAGNOSIS — Z13.1 DIABETES MELLITUS SCREENING: ICD-10-CM

## 2024-10-01 DIAGNOSIS — E55.9 VITAMIN D DEFICIENCY: ICD-10-CM

## 2024-10-01 DIAGNOSIS — I10 PRIMARY HYPERTENSION: ICD-10-CM

## 2024-10-01 DIAGNOSIS — E78.5 DYSLIPIDEMIA: ICD-10-CM

## 2024-10-01 LAB
ANION GAP SERPL CALC-SCNC: 12 MMOL/L (ref 10–20)
BASOPHILS # BLD AUTO: 0.01 X10*3/UL (ref 0–0.1)
BASOPHILS NFR BLD AUTO: 0.2 %
BUN SERPL-MCNC: 16 MG/DL (ref 6–23)
CALCIUM SERPL-MCNC: 9.7 MG/DL (ref 8.6–10.3)
CHLORIDE SERPL-SCNC: 102 MMOL/L (ref 98–107)
CHOLEST SERPL-MCNC: 151 MG/DL (ref 0–199)
CHOLESTEROL/HDL RATIO: 3.4
CO2 SERPL-SCNC: 30 MMOL/L (ref 21–32)
CREAT SERPL-MCNC: 0.73 MG/DL (ref 0.5–1.3)
EGFRCR SERPLBLD CKD-EPI 2021: >90 ML/MIN/1.73M*2
EOSINOPHIL # BLD AUTO: 0.03 X10*3/UL (ref 0–0.7)
EOSINOPHIL NFR BLD AUTO: 0.5 %
ERYTHROCYTE [DISTWIDTH] IN BLOOD BY AUTOMATED COUNT: 14.6 % (ref 11.5–14.5)
GLUCOSE SERPL-MCNC: 85 MG/DL (ref 74–99)
HCT VFR BLD AUTO: 40.4 % (ref 41–52)
HDLC SERPL-MCNC: 44.5 MG/DL
HGB BLD-MCNC: 13 G/DL (ref 13.5–17.5)
IMM GRANULOCYTES # BLD AUTO: 0 X10*3/UL (ref 0–0.7)
IMM GRANULOCYTES NFR BLD AUTO: 0 % (ref 0–0.9)
LDLC SERPL CALC-MCNC: 83 MG/DL
LYMPHOCYTES # BLD AUTO: 1.63 X10*3/UL (ref 1.2–4.8)
LYMPHOCYTES NFR BLD AUTO: 25.9 %
MCH RBC QN AUTO: 29.3 PG (ref 26–34)
MCHC RBC AUTO-ENTMCNC: 32.2 G/DL (ref 32–36)
MCV RBC AUTO: 91 FL (ref 80–100)
MONOCYTES # BLD AUTO: 0.48 X10*3/UL (ref 0.1–1)
MONOCYTES NFR BLD AUTO: 7.6 %
NEUTROPHILS # BLD AUTO: 4.15 X10*3/UL (ref 1.2–7.7)
NEUTROPHILS NFR BLD AUTO: 65.8 %
NON HDL CHOLESTEROL: 107 MG/DL (ref 0–149)
NRBC BLD-RTO: 0 /100 WBCS (ref 0–0)
PLATELET # BLD AUTO: 264 X10*3/UL (ref 150–450)
POTASSIUM SERPL-SCNC: 3.8 MMOL/L (ref 3.5–5.3)
RBC # BLD AUTO: 4.43 X10*6/UL (ref 4.5–5.9)
SODIUM SERPL-SCNC: 140 MMOL/L (ref 136–145)
TRIGL SERPL-MCNC: 120 MG/DL (ref 0–149)
VLDL: 24 MG/DL (ref 0–40)
WBC # BLD AUTO: 6.3 X10*3/UL (ref 4.4–11.3)

## 2024-10-01 PROCEDURE — 36415 COLL VENOUS BLD VENIPUNCTURE: CPT

## 2024-10-01 PROCEDURE — 80061 LIPID PANEL: CPT

## 2024-10-01 PROCEDURE — 82306 VITAMIN D 25 HYDROXY: CPT

## 2024-10-01 PROCEDURE — 80048 BASIC METABOLIC PNL TOTAL CA: CPT

## 2024-10-01 PROCEDURE — 85025 COMPLETE CBC W/AUTO DIFF WBC: CPT

## 2024-10-02 LAB — 25(OH)D3 SERPL-MCNC: 63 NG/ML (ref 30–100)

## 2024-10-30 DIAGNOSIS — I10 ESSENTIAL (PRIMARY) HYPERTENSION: ICD-10-CM

## 2024-10-30 RX ORDER — AMLODIPINE BESYLATE 10 MG/1
TABLET ORAL
Qty: 90 TABLET | Refills: 1 | Status: SHIPPED | OUTPATIENT
Start: 2024-10-30

## 2024-11-15 DIAGNOSIS — E55.9 VITAMIN D DEFICIENCY, UNSPECIFIED: ICD-10-CM

## 2024-11-18 RX ORDER — VIT C/E/ZN/COPPR/LUTEIN/ZEAXAN 250MG-90MG
5000 CAPSULE ORAL EVERY OTHER DAY
Qty: 15 CAPSULE | Refills: 11 | Status: SHIPPED | OUTPATIENT
Start: 2024-11-18

## 2024-11-20 ENCOUNTER — LAB (OUTPATIENT)
Dept: LAB | Facility: LAB | Age: 64
End: 2024-11-20
Payer: MEDICARE

## 2024-11-20 DIAGNOSIS — C61 PROSTATE CANCER (MULTI): ICD-10-CM

## 2024-11-20 LAB — PSA SERPL-MCNC: 9.3 NG/ML

## 2024-11-20 PROCEDURE — 84153 ASSAY OF PSA TOTAL: CPT

## 2024-11-20 PROCEDURE — 36415 COLL VENOUS BLD VENIPUNCTURE: CPT

## 2024-11-24 NOTE — PROGRESS NOTES
Subjective   Patient ID: Ramesh Courtney is a 64 y.o. male who is a Returning patient. He is following up after 6 months with a history of LUTS. He has discontinued the use of the tamsulosin due to visionary symptoms.       HPI  64 y.o. male presents for follow up regarding AS prostate cancer. He underwent transgluteal MRI in-gantry prostate biopsy 7/27/23. Surgical pathology showed prostate cancer GG2. He has a history of proctectomy following radiation to rectum. He has been seen by Dr. Dias, as a result would like to pursue active surveillance.     He has a colostomy with the entire rectum removed.     He has not obtained the MRI of his prostate.     His PSA level has been steadily elevating since 11/2023.    Lab Results   Component Value Date    PSA 9.30 (H) 11/20/2024    PSA 8.38 (H) 05/22/2024    PSA 6.70 (H) 11/02/2023    PSA 8.6 (H) 03/31/2023          Review of Systems  A complete review of systems was performed. All systems are noted to be negative unless indicated in the history of present illness, impression, active problem list, or past histories.     Objective   Physical Exam    Assessment/Plan   Diagnoses and all orders for this visit:  Malignant neoplasm of prostate (Multi)  Rising PSA level      64 y.o. male presents for follow up regarding AS prostate cancer. He underwent transgluteal MRI in-gantry prostate biopsy 7/27/23. Surgical pathology showed prostate cancer GG2. He has a history of proctectomy following radiation to rectum. He has been seen by Dr. Dias, as a result would like to pursue active surveillance.     I reviewed the importance of the MRI of the prostate with the patient because of his closed rectum from rectal surgery, active surveillance may be hard to sustain. The PSA continues to rise. It is difficult to determine the need for treatment, as a repeat biopsy of the prostate would be significantly challenging to perform given his closed rectum. As I would expect in the  future, for him to require treatment of his prostate cancer considering the rate of increase of the PSA. I advised him to start considering treatment with radiation. He is to follow up in March 2025.     Plan:  MRI prostate as part of active surveillance for prostate cancer  F/U in 03/2025    Scribe Attestation   By signing my name below, I, Gonzales Alfred, Mayuribe attestation that this documentation has been prepared under the direction and in the presence of Joyce Crocker MD.

## 2024-11-25 ENCOUNTER — APPOINTMENT (OUTPATIENT)
Dept: UROLOGY | Facility: CLINIC | Age: 64
End: 2024-11-25
Payer: MEDICARE

## 2024-11-25 DIAGNOSIS — R97.20 RISING PSA LEVEL: ICD-10-CM

## 2024-11-25 DIAGNOSIS — C61 MALIGNANT NEOPLASM OF PROSTATE (MULTI): Primary | ICD-10-CM

## 2024-11-25 PROCEDURE — 99214 OFFICE O/P EST MOD 30 MIN: CPT | Performed by: STUDENT IN AN ORGANIZED HEALTH CARE EDUCATION/TRAINING PROGRAM

## 2024-11-25 PROCEDURE — G2211 COMPLEX E/M VISIT ADD ON: HCPCS | Performed by: STUDENT IN AN ORGANIZED HEALTH CARE EDUCATION/TRAINING PROGRAM

## 2024-12-18 DIAGNOSIS — E87.6 HYPOKALEMIA: ICD-10-CM

## 2024-12-18 RX ORDER — POTASSIUM CHLORIDE 750 MG/1
CAPSULE, EXTENDED RELEASE ORAL
Qty: 30 CAPSULE | Refills: 5 | Status: SHIPPED | OUTPATIENT
Start: 2024-12-18

## 2025-02-19 ENCOUNTER — APPOINTMENT (OUTPATIENT)
Dept: RADIOLOGY | Facility: HOSPITAL | Age: 65
End: 2025-02-19
Payer: MEDICARE

## 2025-03-05 ENCOUNTER — APPOINTMENT (OUTPATIENT)
Dept: RADIOLOGY | Facility: HOSPITAL | Age: 65
End: 2025-03-05
Payer: MEDICARE

## 2025-03-05 DIAGNOSIS — C61 PROSTATE CANCER (MULTI): ICD-10-CM

## 2025-03-05 PROCEDURE — 2550000001 HC RX 255 CONTRASTS: Performed by: STUDENT IN AN ORGANIZED HEALTH CARE EDUCATION/TRAINING PROGRAM

## 2025-03-05 PROCEDURE — A9575 INJ GADOTERATE MEGLUMI 0.1ML: HCPCS | Performed by: STUDENT IN AN ORGANIZED HEALTH CARE EDUCATION/TRAINING PROGRAM

## 2025-03-05 PROCEDURE — 72197 MRI PELVIS W/O & W/DYE: CPT

## 2025-03-05 RX ORDER — GADOTERATE MEGLUMINE 376.9 MG/ML
0.2 INJECTION INTRAVENOUS
Status: COMPLETED | OUTPATIENT
Start: 2025-03-05 | End: 2025-03-05

## 2025-03-05 RX ADMIN — GADOTERATE MEGLUMINE 17 ML: 376.9 INJECTION INTRAVENOUS at 13:43

## 2025-03-21 NOTE — PROGRESS NOTES
Subjective   Patient ID: Ramesh Courtney is a 65 y.o. male.      HPI  65 y.o. male presents for a follow up regarding active surveillance for prostate cancer. He underwent transgluteal MRI in-gantry prostate biopsy 7/27/23. Surgical pathology showed prostate cancer GG2. He has been seen by Dr. Dias, as a result would like to pursue active surveillance.    He has a history of proctectomy following radiation to rectum.  He has a colostomy with the entire rectum removed.     His PSA level has been steadily elevating since 11/2023.     He has no major urinary symptoms.     MR PROSTATE WITH MOMO BOUNDARIES IF PIRADS 3 OR ABOVE; 3/5/2025   FINDINGS:  PROSTATE VOLUME:  The prostate measures 4.9 cm x 2.9 cm  x 2.6 cm in right-to-left,  anterior-posterior and craniocaudal dimension.  Prostate weight is estimated at 19.3g. PSA density is 0.48 ng/mL/g.  IMPRESSION:  1. A stable PI-RADS 5 lesion in the left mid to apical peripheral and transition zone. No definite signs of gross extracapsular extension.  No evidence of enlarged pelvic lymph nodes.    Lab Results   Component Value Date    PSA 9.30 (H) 11/20/2024    PSA 8.38 (H) 05/22/2024    PSA 6.70 (H) 11/02/2023    PSA 8.6 (H) 03/31/2023         Review of Systems  A complete review of systems was performed. All systems are noted to be negative unless indicated in the history of present illness, impression, active problem list, or past histories.     Objective   Physical Exam    Assessment/Plan   Diagnoses and all orders for this visit:  Malignant neoplasm of prostate (Multi)  -     Prostate Specific Antigen; Future      65 y.o. male presents for a follow up regarding active surveillance for prostate cancer. He underwent transgluteal MRI in-gantry prostate biopsy 7/27/23. Surgical pathology showed prostate cancer GG2. He has a history of proctectomy following radiation to rectum. He has been seen by Dr. Dias, as a result would like to pursue active surveillance.     I  personally reviewed the MRI of the prostate that was completed on 03/05/2025. The imaging reveals a stable PIRADS 5 lesion in the left mid to apical peripheral and transition zone.   The MRI is unchanged from his previous MRI. The patient's PSA level has increased slightly from 8.38 to 9.3 in 6 months.     Obtaining a prostate biopsy, may be difficult due to having his entire rectum removed. We would need to obtain a different type of needle. During his last biopsy, it was difficult to obtain the needles and time was required to wait for them to arrive.     If the patient has a greater increasing elevation, we will consider the biopsy or treatment options in greater detail. We will obtain a PSA level every 6 months.     Plan:  PSA 05/2025 for active surveillance of prostate cancer   FUV 06/2025      Scribe Attestation   By signing my name below, Gonzales MONTOYA, Scribe attestation that this documentation has been prepared under the direction and in the presence of Joyce Crocker MD.

## 2025-03-24 ENCOUNTER — APPOINTMENT (OUTPATIENT)
Dept: UROLOGY | Facility: CLINIC | Age: 65
End: 2025-03-24
Payer: MEDICARE

## 2025-03-24 DIAGNOSIS — C61 MALIGNANT NEOPLASM OF PROSTATE (MULTI): Primary | ICD-10-CM

## 2025-03-24 PROCEDURE — 99214 OFFICE O/P EST MOD 30 MIN: CPT | Performed by: STUDENT IN AN ORGANIZED HEALTH CARE EDUCATION/TRAINING PROGRAM

## 2025-03-24 PROCEDURE — G2211 COMPLEX E/M VISIT ADD ON: HCPCS | Performed by: STUDENT IN AN ORGANIZED HEALTH CARE EDUCATION/TRAINING PROGRAM

## 2025-03-25 NOTE — PROGRESS NOTES
Subjective   Patient ID: Ramesh Courtney is a 65 y.o. male who presents for Medicare Annual Wellness Visit Subsequent (No concerns or issues at this time; ).    HPI    Past medical history of colorectal cancer in 2008 s/p colostomy.   Dr. Ramesh Gómez did his surgery.    Ear fullness: Had some issues back in Decemeber with feeling fluid in ear, states it seems to be getting better. Tried antihistamines a few times and this help. Had issues with ear wax when he was younger.      Prostate cancer: Getting repeat PSA every 6 months. Just monitoring this for now. Following with Dr. Crocker. Had biopsy done in July 2023.      Hypertension- He is taking Amlodipine 10 mg oral tablet and Hydrochlorothiazide 25 mg oral tablet daily. Well controlled      Tachycardia- He is on carvedilol 6.25 mg oral tablet twice a day.     Hypokalemia-  Patient is taking Potassium Chloride capsule every day.     Vitamin D deficiency- Vitamin D level increased, He is on oral vitamin capsule 5000 unit daily.     Dyslipidemia- LDL level improved to 80 from 108 with lifestyle modification.     Nicotine dependence: Smoker unsure of amount smoking more.  Started smoking when he was 15 years old, Quit for 4 years.     Review of systems completed and unremarkable other than what is documented in HPI.    Objective   /76 (BP Location: Right arm, Patient Position: Sitting, BP Cuff Size: Adult)   Pulse 89   Wt 84.5 kg (186 lb 3.2 oz)   BMI 27.50 kg/m²     Patient Health Questionnaire-2 Score: 0 (3/31/2025  8:43 AM)    Physical Exam    Gen: No acute distress, alert and oriented x3, pleasant   HEENT: moist mucous membranes, b/l external auditory canals are clear of debris, TMs within normal limits, no oropharyngeal lesions, eomi, perrla   Neck: thyroid within normal limits, no lymphadenopathy   CV: RRR, normal S1/S2, no murmur   Resp: Clear to auscultation bilaterally, no wheezes or rhonchi appreciated  Abd: soft, nontender, non-distended,  no guarding/rigidity, bowel sounds present  Extr: no edema, no calf tenderness  Derm: Skin is warm and dry, no rashes appreciated  Psych: mood is good, affect is congruent, good hygiene, normal speech and eye contact  Neuro: cranial nerves grossly intact, normal gait      Assessment/Plan       #Prostate cancer  Following with Dr. Crocker  Monitoring PSA       HTN  - Continue Amlodipine and Hydrochlorothiazide.  - CBC and BMP is normal     Dyslipidemia  - Continue Lifestyle modification.  -AAA screening ask at follow up      Tachycardia  - Continue Carvedilol to 6.25 mg twice a day.      Vitamin D deficiency  - Vitamin D level 49  - Continue Vitamin D tablet 5000 Unit every other day now.     Hypokalemia  - Potassium replacement supplement.     S/p colostomy   - doing well      #Nicotine dependence   ~ ppd smoker 45 years   Declines low dose lung cancer screening at this time   Discusses benefits and risks, patient declines at this time  States he would not like treatment even if he did have lung cancer      #HCM  Flu utd  Colonoscopy 2008 s/p colostomy   Pneumonia administered 9/27/23  Will ask pharmacy about covid booster   Will get shingles at pharmacy   MCA done 3/2025        Return to clinic in 6 months or earlier as needed

## 2025-03-31 ENCOUNTER — APPOINTMENT (OUTPATIENT)
Dept: PRIMARY CARE | Facility: CLINIC | Age: 65
End: 2025-03-31
Payer: MEDICARE

## 2025-03-31 VITALS
SYSTOLIC BLOOD PRESSURE: 115 MMHG | HEART RATE: 89 BPM | DIASTOLIC BLOOD PRESSURE: 76 MMHG | WEIGHT: 186.2 LBS | BODY MASS INDEX: 27.5 KG/M2

## 2025-03-31 DIAGNOSIS — E87.6 HYPOKALEMIA: ICD-10-CM

## 2025-03-31 DIAGNOSIS — E55.9 VITAMIN D DEFICIENCY: ICD-10-CM

## 2025-03-31 DIAGNOSIS — F17.210 CIGARETTE NICOTINE DEPENDENCE WITHOUT COMPLICATION: ICD-10-CM

## 2025-03-31 DIAGNOSIS — E78.5 DYSLIPIDEMIA: ICD-10-CM

## 2025-03-31 DIAGNOSIS — Z93.3 S/P COLOSTOMY (MULTI): ICD-10-CM

## 2025-03-31 DIAGNOSIS — Z00.00 ROUTINE GENERAL MEDICAL EXAMINATION AT HEALTH CARE FACILITY: Primary | ICD-10-CM

## 2025-03-31 PROCEDURE — 3074F SYST BP LT 130 MM HG: CPT | Performed by: REGISTERED NURSE

## 2025-03-31 PROCEDURE — G0439 PPPS, SUBSEQ VISIT: HCPCS | Performed by: REGISTERED NURSE

## 2025-03-31 PROCEDURE — 1124F ACP DISCUSS-NO DSCNMKR DOCD: CPT | Performed by: REGISTERED NURSE

## 2025-03-31 PROCEDURE — 3078F DIAST BP <80 MM HG: CPT | Performed by: REGISTERED NURSE

## 2025-03-31 PROCEDURE — 1170F FXNL STATUS ASSESSED: CPT | Performed by: REGISTERED NURSE

## 2025-03-31 PROCEDURE — 1160F RVW MEDS BY RX/DR IN RCRD: CPT | Performed by: REGISTERED NURSE

## 2025-03-31 PROCEDURE — 1159F MED LIST DOCD IN RCRD: CPT | Performed by: REGISTERED NURSE

## 2025-03-31 ASSESSMENT — ACTIVITIES OF DAILY LIVING (ADL)
GROCERY_SHOPPING: INDEPENDENT
MANAGING_FINANCES: INDEPENDENT
DRESSING: INDEPENDENT
DOING_HOUSEWORK: INDEPENDENT
BATHING: INDEPENDENT
TAKING_MEDICATION: INDEPENDENT

## 2025-04-12 DIAGNOSIS — R00.0 TACHYCARDIA: ICD-10-CM

## 2025-04-12 DIAGNOSIS — I10 PRIMARY HYPERTENSION: ICD-10-CM

## 2025-04-14 RX ORDER — CARVEDILOL 6.25 MG/1
TABLET ORAL
Qty: 180 TABLET | Refills: 3 | Status: SHIPPED | OUTPATIENT
Start: 2025-04-14

## 2025-05-01 DIAGNOSIS — C61 MALIGNANT NEOPLASM OF PROSTATE (MULTI): ICD-10-CM

## 2025-05-01 DIAGNOSIS — I10 ESSENTIAL (PRIMARY) HYPERTENSION: ICD-10-CM

## 2025-05-01 RX ORDER — AMLODIPINE BESYLATE 10 MG/1
10 TABLET ORAL DAILY
Qty: 90 TABLET | Refills: 1 | Status: SHIPPED | OUTPATIENT
Start: 2025-05-01

## 2025-05-02 RX ORDER — HYDROCHLOROTHIAZIDE 25 MG/1
25 TABLET ORAL DAILY
Qty: 90 TABLET | Refills: 3 | Status: SHIPPED | OUTPATIENT
Start: 2025-05-02

## 2025-05-30 LAB — PSA SERPL-MCNC: 9.93 NG/ML

## 2025-06-08 NOTE — PROGRESS NOTES
Subjective   Patient ID: Ramesh Courtney is a 65 y.o. male.      HPI  65 y.o. male presents for a follow up visit regarding active surveillance for prostate cancer. He underwent a transgluteal MRI in-gantry prostate biopsy on 07/27/2023. Surgical pathology detailed Riga score 3+4=7, GG2. PSA level 9.93.    He is experiencing a weak urinary stream.     He has a history of proctectomy following radiation to rectum. He has a colostomy with the entire rectum removed.       Lab Results   Component Value Date    PSA 9.93 (H) 05/29/2025    PSA 9.30 (H) 11/20/2024    PSA 8.38 (H) 05/22/2024    PSA 6.70 (H) 11/02/2023    PSA 8.6 (H) 03/31/2023       MR PROSTATE WITH MOMO BOUNDARIES IF PIRADS 3 OR ABOVE; 3/5/2025   FINDINGS:  PROSTATE VOLUME:  The prostate measures 4.9 cm x 2.9 cm  x 2.6 cm in right-to-left,  anterior-posterior and craniocaudal dimension.  Prostate weight is estimated at 19.3g. PSA density is 0.48 ng/mL/g.  IMPRESSION:  1. A stable PI-RADS 5 lesion in the left mid to apical peripheral and transition zone. No definite signs of gross extracapsular extension.  No evidence of enlarged pelvic lymph nodes.    Review of Systems  A complete review of systems was performed. All systems are noted to be negative unless indicated in the history of present illness, impression, active problem list, or past histories.     Objective   Physical Exam    Assessment/Plan   Diagnoses and all orders for this visit:  Malignant neoplasm of prostate (Multi)  -     Prostate Specific Antigen; Future  Benign prostatic hyperplasia with weak urinary stream  -     tadalafil (Cialis) 5 mg tablet; Take 1 tablet (5 mg) by mouth once daily.      65 y.o. male presents for a follow up visit regarding active surveillance for prostate cancer. He underwent a transgluteal MRI in-gantry prostate biopsy on 07/27/2023. Surgical pathology detailed Riga score 3+4=7, GG2. PSA level 9.93.    Due to the complexity of other health concerns, we  previously discussed continuing active surveillance.     I personally reviewed the PSA level that elevated from 9.3 to 9.93 in 6 months.      I personally reviewed the patient's PSA level that was obtained on 03/05/2025. The imaging details a stable PI-RADS 5 lesion in the left mid to apical peripheral and transition zone. Considering the MRI of the prostate has a stable lesion, I am not concerned at this time.     Completing another biopsy of the prostate would be complicated logically or completely unattainable. I have reviewed with the patient that he needs to determine at what point he would like to begin treatment of the prostate cancer. He would rather continue with active surveillance at this time.     We will continue to obtain repeat PSA levels every 6 months. His next PSA level is ordered for 11/2025. He will follow up with Dr. Haynes in 12/2025.    As the patient is experiencing a weak urinary stream, we dicussed about prescribing the patient Cialis 5 mg daily. The medication can help promote blood to the area of the pelvis. We discussed risks, benefits, alternatives and side effects. He would like to proceed with the medication.         Plan:  Start Cialis 5 mg daily, side effects reviewed   PSA 11/2025 for active surveillance of prostate cancer  FUV with Dr. Haynes in 12/2025        Scribe Attestation   By signing my name below, I, Gonzales Alfred, Scribe attestation that this documentation has been prepared under the direction and in the presence of Joyce Crocker MD.

## 2025-06-09 ENCOUNTER — APPOINTMENT (OUTPATIENT)
Dept: UROLOGY | Facility: CLINIC | Age: 65
End: 2025-06-09
Payer: MEDICARE

## 2025-06-09 DIAGNOSIS — N40.1 BENIGN PROSTATIC HYPERPLASIA WITH WEAK URINARY STREAM: ICD-10-CM

## 2025-06-09 DIAGNOSIS — R39.12 BENIGN PROSTATIC HYPERPLASIA WITH WEAK URINARY STREAM: ICD-10-CM

## 2025-06-09 DIAGNOSIS — C61 MALIGNANT NEOPLASM OF PROSTATE (MULTI): Primary | ICD-10-CM

## 2025-06-09 PROCEDURE — 99214 OFFICE O/P EST MOD 30 MIN: CPT | Performed by: STUDENT IN AN ORGANIZED HEALTH CARE EDUCATION/TRAINING PROGRAM

## 2025-06-10 RX ORDER — TADALAFIL 5 MG/1
5 TABLET ORAL DAILY
Qty: 30 TABLET | Refills: 11 | Status: SHIPPED | OUTPATIENT
Start: 2025-06-10 | End: 2026-06-10

## 2025-07-06 DIAGNOSIS — E87.6 HYPOKALEMIA: ICD-10-CM

## 2025-07-07 RX ORDER — POTASSIUM CHLORIDE 750 MG/1
10 CAPSULE, EXTENDED RELEASE ORAL DAILY
Qty: 90 CAPSULE | Refills: 1 | Status: SHIPPED | OUTPATIENT
Start: 2025-07-07

## 2025-09-29 ENCOUNTER — APPOINTMENT (OUTPATIENT)
Dept: PRIMARY CARE | Facility: CLINIC | Age: 65
End: 2025-09-29
Payer: MEDICARE

## 2025-12-09 ENCOUNTER — APPOINTMENT (OUTPATIENT)
Dept: UROLOGY | Facility: CLINIC | Age: 65
End: 2025-12-09
Payer: MEDICARE